# Patient Record
Sex: MALE | Race: BLACK OR AFRICAN AMERICAN | NOT HISPANIC OR LATINO | Employment: UNEMPLOYED | URBAN - METROPOLITAN AREA
[De-identification: names, ages, dates, MRNs, and addresses within clinical notes are randomized per-mention and may not be internally consistent; named-entity substitution may affect disease eponyms.]

---

## 2017-02-17 ENCOUNTER — HOSPITAL ENCOUNTER (EMERGENCY)
Facility: MEDICAL CENTER | Age: 27
End: 2017-02-17
Attending: EMERGENCY MEDICINE

## 2017-02-17 VITALS
OXYGEN SATURATION: 98 % | TEMPERATURE: 97.2 F | SYSTOLIC BLOOD PRESSURE: 115 MMHG | HEART RATE: 97 BPM | HEIGHT: 72 IN | RESPIRATION RATE: 14 BRPM | BODY MASS INDEX: 21.56 KG/M2 | WEIGHT: 159.17 LBS | DIASTOLIC BLOOD PRESSURE: 67 MMHG

## 2017-02-17 DIAGNOSIS — L02.91 ABSCESS: ICD-10-CM

## 2017-02-17 PROCEDURE — 700101 HCHG RX REV CODE 250

## 2017-02-17 PROCEDURE — 99283 EMERGENCY DEPT VISIT LOW MDM: CPT

## 2017-02-17 PROCEDURE — 303977 HCHG I & D

## 2017-02-17 RX ORDER — CEPHALEXIN 500 MG/1
500 CAPSULE ORAL 4 TIMES DAILY
Qty: 40 CAP | Refills: 0 | Status: SHIPPED | OUTPATIENT
Start: 2017-02-17 | End: 2017-02-24

## 2017-02-17 RX ORDER — SULFAMETHOXAZOLE AND TRIMETHOPRIM 800; 160 MG/1; MG/1
1 TABLET ORAL 2 TIMES DAILY
Qty: 14 TAB | Refills: 0 | Status: SHIPPED | OUTPATIENT
Start: 2017-02-17 | End: 2017-02-24

## 2017-02-17 RX ORDER — HYDROCODONE BITARTRATE AND ACETAMINOPHEN 5; 325 MG/1; MG/1
1-2 TABLET ORAL EVERY 4 HOURS PRN
Qty: 20 TAB | Refills: 0 | Status: SHIPPED | OUTPATIENT
Start: 2017-02-17

## 2017-02-17 RX ORDER — LIDOCAINE HYDROCHLORIDE 10 MG/ML
INJECTION, SOLUTION INFILTRATION; PERINEURAL
Status: COMPLETED
Start: 2017-02-17 | End: 2017-02-17

## 2017-02-17 RX ADMIN — LIDOCAINE HYDROCHLORIDE: 10 INJECTION, SOLUTION INFILTRATION; PERINEURAL at 09:26

## 2017-02-17 NOTE — ED NOTES
Pt discharged to home. Pt armband removed. Pt understands written and verbal d/c instructions.  Prescriptions given.  Pt to follow up with Surgery.  Pt verbalized understanding.

## 2017-02-17 NOTE — ED AVS SNAPSHOT
2/17/2017          Katerina Colbert  [address]  [place] [address]    Dear Katerina:    [facility] wants to ensure your discharge home is safe and you or your loved ones have had all your questions answered regarding your care after you leave the hospital.    You may receive a telephone call within two days of your discharge.  This call is to make certain you understand your discharge instructions as well as ensure we provided you with the best care possible during your stay with us.     The call will only last approximately 3-5 minutes and will be done by a nurse.    Once again, we want to ensure your discharge home is safe and that you have a clear understanding of any next steps in your care.  If you have any questions or concerns, please do not hesitate to contact us, we are here for you.  Thank you for choosing [facility] for your healthcare needs.    Sincerely,    Silvio Lerner    [facility]

## 2017-02-17 NOTE — ED NOTES
Chief Complaint   Patient presents with   • Abscess     Right leg    Pt to triage in NAD.  Pt educated on triage process and instructed to notify triage RN of any change in status.

## 2017-02-17 NOTE — ED AVS SNAPSHOT
UReserv Access Code: [email or web address]  Expires: 3/19/2017  9:48 AM    Your email address is not on file at [facility].  Email Addresses are required for you to sign up for UReserv, please contact [phone] to verify your personal information and to provide your email address prior to attempting to register for UReserv.    Katerina Gallegos Colbert  [address] [place] [address]    [employer]  A secure, online tool to manage your health information     [facility] [employer]’s UReserv® is a secure, online tool that connects you to your personalized health information from the privacy of your home -- day or night - making it very easy for you to manage your healthcare. Once the activation process is completed, you can even access your medical information using the UReserv gallito, which is available for free in the Apple Gallito store or Google Play store.     To learn more about UReserv, visit [email or web address]    There are two levels of access available (as shown below):   My Chart Features  [facility] Primary Care Doctor [facility]  Specialists [facility]  Urgent  Care Non-[facility] Primary Care Doctor   Email your healthcare team securely and privately 24/7 X X X    Manage appointments: schedule your next appointment; view details of past/upcoming appointments X      Request prescription refills. X      View recent personal medical records, including lab and immunizations X X X X   View health record, including health history, allergies, medications X X X X   Read reports about your outpatient visits, procedures, consult and ER notes X X X X   See your discharge summary, which is a recap of your hospital and/or ER visit that includes your diagnosis, lab results, and care plan X X  X     How to register for UReserv:  Once your e-mail address has been verified, follow the following steps to sign up for [employer].     1. Go to  [email or web address]  2. Click on the Sign Up Now box, which takes you to the New Member Sign Up page. You  will need to provide the following information:  a. Enter your Fjuul Access Code exactly as it appears at the top of this page. (You will not need to use this code after you’ve completed the sign-up process. If you do not sign up before the expiration date, you must request a new code.)   b. Enter your date of birth.   c. Enter your home email address.   d. Click Submit, and follow the next screen’s instructions.  3. Create a [employer] ID. This will be your Fjuul login ID and cannot be changed, so think of one that is secure and easy to remember.  4. Create a Fjuul password. You can change your password at any time.  5. Enter your Password Reset Question and Answer. This can be used at a later time if you forget your password.   6. Enter your e-mail address. This allows you to receive e-mail notifications when new information is available in Fjuul.  7. Click Sign Up. You can now view your health information.    For assistance activating your Fjuul account, call [phone]

## 2017-02-17 NOTE — ED PROVIDER NOTES
ED Provider Note    CHIEF COMPLAINT  Chief Complaint   Patient presents with   • Abscess     Right leg        HPI  Katerina Colbert is a 26 y.o. male who presents with and abscess to his right leg which was a small bump, but 3 days ago it started enlarging and filling with pus.   He has not had any fever or redness but it hurts to walk.   He denies any IVDA, no history of MRSA or wrestling    REVIEW OF SYSTEMS  Positive for abscess, Negative for fever or vomitingt  PAST MEDICAL HISTORY      none  SOCIAL HISTORY  Social History     Social History Main Topics   • Smoking status: Current Some Day Smoker -- 0.50 packs/day     Types: Cigarettes   • Smokeless tobacco: Not on file   • Alcohol Use: Yes      Comment: occasional   • Drug Use: No   • Sexual Activity: Not on file       SURGICAL HISTORY   has past surgical history that includes hernia repair.    CURRENT MEDICATIONS  Reviewed.  See Encounter Summary.  Include none    ALLERGIES  No Known Allergies    PHYSICAL EXAM  VITAL SIGNS: /67 mmHg  Pulse 97  Temp(Src) 36.2 °C (97.2 °F) (Temporal)  Resp 14  Ht 1.829 m (6')  Wt 72.2 kg (159 lb 2.8 oz)  BMI 21.58 kg/m2  SpO2 98%  Constitutional: Alert in no apparent distress.  HENT: Normocephalic, Bilateral external ears normal. Nose normal.   Eyes: Pupils are equal and reactive. Conjunctiva normal, non-icteric.   Thorax & Lungs: Easy unlabored respirations  Abdomen:  No gross signs of peritonitis, no pain with movement   Skin: quarter sized fluctuan mass medail to right thigh with central purulent region  Extremities:   No edema, No asymmetry  Neurologic: Alert, Grossly non-focal.   Psychiatric: Affect and Mood normal      COURSE & MEDICAL DECISION MAKING  Nursing notes and vital signs were reviewed. (See chart for details)    The patient presents to the Emergency Department with assistance than present on his legs for over a year which has become superinfected. In the emergency department decision was made  for IND    Incision and Drainage Procedure Note    Indication: Abscess    Procedure: The patient was positioned appropriately and the skin over the incision site was prepped with betadine and draped in a sterile fashion. Local anesthesia was obtained by infiltration using 1% Lidocaine without epinephrine.  An incision was then made over the apex of the lesion and approximately 1 cc of thick material was expressed. Loculations were not present. The drainage cavity was then packed with sterile gauze.     The patient tolerated the procedure well.    Complications: None    In the emergency room and the patient has a purulent aspect ring but there was still a large cystic feeling mass that discuss with him he should follow-up with general surgery it is Dr. Perez on call for evaluation and excision of the mass. He knows that if the redness increases or he has any worsening symptoms to return and I have discharged him home on Keflex and Bactrim as well as Vicodin for pain control  .     Patient was looked up in the narcotic website prior to prescription for narcotic pain medication.  Precautions on not driving and the addictive potential of pain medication was known to the patient.       The patient was discharged home with an information sheet on abscess and told to return immediately for any signs or symptoms listed, but specifically if fever increased size of lesion, or any worsening at all.  The patient verbally agreed to the discharge precautions and follow-up plan which is documented in EPIC.    FINAL IMPRESSION  1. Infected lesion right leg  2. Incision and drainage of right leg abscess  3.             Electronically signed by: Shaye Workman, 2/17/2017 9:29 AM

## 2017-02-17 NOTE — ED AVS SNAPSHOT
Home Care Instructions                                                                                                                Katerina Colbert   MRN: [id number]    Department:  [facility], Emergency Dept   Date of Visit:  2/17/2017            [facility], Emergency Dept    [address]    [facility] [address]    Phone:  [phone]      You were seen by     Shaye Workman M.D.      Your Diagnosis Was     Abscess     L02.91       These are the medications you received during your hospitalization from 02/17/2017 0905 to 02/17/2017 0948     Date/Time Order Dose Route Action    02/17/2017 0926 LIDOCAINE HCL 1 % INJ SOLN    Given      Follow-up Information     1. Follow up with Rajiv Perez M.D.. Schedule an appointment as soon as possible for a visit today.    Specialty:  Surgery    Why:  return if fever or increased redness, dressing changes twice a day    Contact information    [address] [facility] [address]  [address]  [facility] [id number] [phone]        Medication Information     Review all of your home medications and newly ordered medications with your primary doctor and/or pharmacist as soon as possible. Follow medication instructions as directed by your doctor and/or pharmacist.     Please keep your complete medication list with you and share with your physician. Update the information when medications are discontinued, doses are changed, or new medications (including over-the-counter products) are added; and carry medication information at all times in the event of emergency situations.               Medication List      START taking these medications        Instructions    cephALEXin 500 MG Caps   Commonly known as:  KEFLEX    Take 1 Cap by mouth 4 times a day for 7 days.   Dose:  500 mg       sulfamethoxazole-trimethoprim 800-160 MG tablet   Commonly known as:  BACTRIM DS    Take 1 Tab by mouth 2 times a day for 7 days.   Dose:  1 Tab         ASK your  doctor about these medications        Instructions    amoxicillin-clavulanate 875-125 MG Tabs   Commonly known as:  AUGMENTIN    Take 1 Tab by mouth 2 times a day.   Dose:  1 Tab       * hydrocodone-acetaminophen 5-325 MG Tabs per tablet   What changed:  Another medication with the same name was added. Make sure you understand how and when to take each.   Commonly known as:  NORCO   Ask about: Which instructions should I use?    Take 1-2 Tabs by mouth every four hours as needed.   Dose:  1-2 Tab       * hydrocodone-acetaminophen 5-325 MG Tabs per tablet   What changed:  You were already taking a medication with the same name, and this prescription was added. Make sure you understand how and when to take each.   Commonly known as:  NORCO   Ask about: Which instructions should I use?    Take 1-2 Tabs by mouth every four hours as needed.   Dose:  1-2 Tab       * Notice:  This list has 2 medication(s) that are the same as other medications prescribed for you. Read the directions carefully, and ask your doctor or other care provider to review them with you.              Discharge Instructions       Abscess  An abscess is an infected area that contains a collection of pus and debris. It can occur in almost any part of the body. An abscess is also known as a furuncle or boil.  CAUSES   An abscess occurs when tissue gets infected. This can occur from blockage of oil or sweat glands, infection of hair follicles, or a minor injury to the skin. As the body tries to fight the infection, pus collects in the area and creates pressure under the skin. This pressure causes pain. People with weakened immune systems have difficulty fighting infections and get certain abscesses more often.   SYMPTOMS  Usually an abscess develops on the skin and becomes a painful mass that is red, warm, and tender. If the abscess forms under the skin, you may feel a moveable soft area under the skin. Some abscesses break open (rupture) on their own, but  most will continue to get worse without care. The infection can spread deeper into the body and eventually into the bloodstream, causing you to feel ill.   DIAGNOSIS   Your caregiver will take your medical history and perform a physical exam. A sample of fluid may also be taken from the abscess to determine what is causing your infection.  TREATMENT   Your caregiver may prescribe antibiotic medicines to fight the infection. However, taking antibiotics alone usually does not cure an abscess. Your caregiver may need to make a small cut (incision) in the abscess to drain the pus. In some cases, gauze is packed into the abscess to reduce pain and to continue draining the area.  HOME CARE INSTRUCTIONS   · Only take over-the-counter or prescription medicines for pain, discomfort, or fever as directed by your caregiver.  · If you were prescribed antibiotics, take them as directed. Finish them even if you start to feel better.  · If gauze is used, follow your caregiver's directions for changing the gauze.  · To avoid spreading the infection:  ¨ Keep your draining abscess covered with a bandage.  ¨ Wash your hands well.  ¨ Do not share personal care items, towels, or whirlpools with others.  ¨ Avoid skin contact with others.  · Keep your skin and clothes clean around the abscess.  · Keep all follow-up appointments as directed by your caregiver.  SEEK MEDICAL CARE IF:   · You have increased pain, swelling, redness, fluid drainage, or bleeding.  · You have muscle aches, chills, or a general ill feeling.  · You have a fever.  MAKE SURE YOU:   · Understand these instructions.  · Will watch your condition.  · Will get help right away if you are not doing well or get worse.     This information is not intended to replace advice given to you by your health care provider. Make sure you discuss any questions you have with your health care provider.     Document Released: 09/27/2006 Document Revised: 06/18/2013 Document Reviewed:  03/01/2013  Elsevier Interactive Patient Education ©2016 Elsevier Inc.            Patient Information     Patient Information    Following emergency treatment: all patient requiring follow-up care must return either to a private physician or a clinic if your condition worsens before you are able to obtain further medical attention, please return to the emergency room.     Billing Information    At [facility], we work to make the billing process streamlined for our patients.  Our Representatives are here to answer any questions you may have regarding your hospital bill.  If you have insurance coverage and have supplied your insurance information to us, we will submit a claim to your insurer on your behalf.  Should you have any questions regarding your bill, we can be reached online or by phone as follows:  Online: You are able pay your bills online or live chat with our representatives about any billing questions you may have. We are here to help Monday - Friday from 8:00am to 7:30pm and 9:00am - 12:00pm on Saturdays.  Please visit [email or web address]  for more information.   Phone:  [phone] or [phone]    Please note that your emergency physician, surgeon, pathologist, radiologist, anesthesiologist, and other specialists are not employed by [facility] and will therefore bill separately for their services.  Please contact them directly for any questions concerning their bills at the numbers below:     Emergency Physician Services:  [phone]  [facility]:  [phone]  [facility]:  [phone]  [facility]:  [phone]    1. Your final bill may vary from the amount quoted upon discharge if all procedures are not complete at that time, or if your doctor has additional procedures of which we are not aware. You will receive an additional bill if you return to the Emergency Department at [facility] for suture removal  regardless of the facility of which the sutures were placed.     2. Please arrange for settlement of this account at the emergency registration.    3. All self-pay accounts are due in full at the time of treatment.  If you are unable to meet this obligation then payment is expected within 4-5 days.     4. If you have had radiology studies (CT, X-ray, Ultrasound, MRI), you have received a preliminary result during your emergency department visit. Please contact the radiology department [phone] to receive a copy of your final result. Please discuss the Final result with your primary physician or with the follow up physician provided.     Crisis Hotline:  [place] Crisis Hotline:  1-992-HRZRIZR or 1-871.477.8542  [place] Crisis Hotline:    1-483.551.4853 or 635-238-2668         ED Discharge Follow Up Questions    1. In order to provide you with very good care, we would like to follow up with a phone call in the next few days.  May we have your permission to contact you?     YES /  NO    2. What is the best phone number to call you? (       )_____-__________    3. What is the best time to call you?      Morning  /  Afternoon  /  Evening                   Patient Signature:  ____________________________________________________________    Date:  ____________________________________________________________

## 2017-02-17 NOTE — ED NOTES
Chief Complaint   Patient presents with   • Abscess     Right leg      Pt ambulatory to rm 61.  Pt c/o small abscess to right leg, 10/10 pain.  Pt noticed small bump X 3 days ago.  Abscess did start draining while pt was in lobby.  Chart up for ERP.

## 2023-05-10 ENCOUNTER — HOSPITAL ENCOUNTER (EMERGENCY)
Facility: MEDICAL CENTER | Age: 33
End: 2023-05-10
Attending: EMERGENCY MEDICINE
Payer: MEDICAID

## 2023-05-10 VITALS
TEMPERATURE: 97.9 F | BODY MASS INDEX: 22.65 KG/M2 | HEART RATE: 91 BPM | DIASTOLIC BLOOD PRESSURE: 91 MMHG | OXYGEN SATURATION: 99 % | HEIGHT: 71 IN | WEIGHT: 161.82 LBS | SYSTOLIC BLOOD PRESSURE: 136 MMHG | RESPIRATION RATE: 16 BRPM

## 2023-05-10 DIAGNOSIS — K08.89 PAIN, DENTAL: ICD-10-CM

## 2023-05-10 PROCEDURE — 99282 EMERGENCY DEPT VISIT SF MDM: CPT

## 2023-05-10 RX ORDER — IBUPROFEN 800 MG/1
800 TABLET ORAL EVERY 8 HOURS PRN
Qty: 30 TABLET | Refills: 0 | Status: SHIPPED | OUTPATIENT
Start: 2023-05-10

## 2023-05-10 RX ORDER — TRAMADOL HYDROCHLORIDE 50 MG/1
50 TABLET ORAL EVERY 6 HOURS PRN
Qty: 10 TABLET | Refills: 0 | Status: SHIPPED | OUTPATIENT
Start: 2023-05-10 | End: 2023-05-14

## 2023-05-10 RX ORDER — PENICILLIN V POTASSIUM 500 MG/1
500 TABLET ORAL 4 TIMES DAILY
Qty: 40 TABLET | Refills: 0 | Status: ACTIVE | OUTPATIENT
Start: 2023-05-10 | End: 2023-05-20

## 2023-05-10 NOTE — ED TRIAGE NOTES
"Katerina Colbert  32 y.o. male  Chief Complaint   Patient presents with    Tooth Abscess     Left side x 2 days, swelling present to the left side of the face. Patient reports \"pressure.\" Patient denies pain to the tooth.        Vitals:    05/10/23 1355   BP: 123/82   Pulse: 94   Resp: 17   Temp: 36.3 °C (97.4 °F)   SpO2: 98%       Triage process explained to patient, apologized for wait time, and returned to lobby.  Pt informed to notify staff of any change in condition.     "

## 2023-05-10 NOTE — ED NOTES
Patient ambulated to [facility] without incident. Primary assessment complete. Patient oriented to care area. Chart up for ERP.

## 2023-05-10 NOTE — ED PROVIDER NOTES
"ED Provider Note    CHIEF COMPLAINT  Chief Complaint   Patient presents with    Tooth Abscess     Left side x 2 days, swelling present to the left side of the face. Patient reports \"pressure.\" Patient denies pain to the tooth.        EXTERNAL RECORDS REVIEWED  None    HPI/ROS  LIMITATION TO HISTORY   None  OUTSIDE HISTORIAN(S):  None    Delfinviralpennie Colbert is a 32 y.o. male who presents here for evaluation of left-sided facial swelling.  Patient states that he has a bad tooth in the upper left part of his mouth, and for 2 days he has noticed some increasing pain and some left cheek swelling.  He has no fever or chills, and no vomiting.  Patient states he has no neck pain, back pain, or chest pain or shortness of breath.  He has no trouble eating or drinking.    PAST MEDICAL HISTORY   No medical concerns    SURGICAL HISTORY   has a past surgical history that includes hernia repair.    FAMILY HISTORY  History reviewed. No pertinent family history.    SOCIAL HISTORY  Social History     Tobacco Use    Smoking status: Some Days     Packs/day: 0.50     Types: Cigarettes    Smokeless tobacco: Not on file   Vaping Use    Vaping Use: Not on file   Substance and Sexual Activity    Alcohol use: Yes     Comment: occasional    Drug use: No    Sexual activity: Not on file       CURRENT MEDICATIONS  Home Medications    **Home medications have not yet been reviewed for this encounter**         ALLERGIES  No Known Allergies    PHYSICAL EXAM  VITAL SIGNS: /82   Pulse 94   Temp 36.3 °C (97.4 °F) (Temporal)   Resp 17   Ht 1.803 m (5' 11\")   Wt 73.4 kg (161 lb 13.1 oz)   SpO2 98%   BMI 22.57 kg/m²    Constitutional: Well developed, well nourished. No acute distress.  HEENT: Normocephalic, atraumatic. Posterior pharynx clear and moist.  Left-sided facial swelling, no trismus, poor dentition.  Eyes:  EOMI. Normal sclera.  Neck: Supple, Full range of motion, nontender.  Musculoskeletal: No deformity, no edema, " neurovascular intact.   Neuro: Clear speech, appropriate, cooperative, cranial nerves II-XII grossly intact.  Psych: Normal mood and affect      DIAGNOSTIC STUDIES / PROCEDURES  None    RADIOLOGY  Patient declined    COURSE & MEDICAL DECISION MAKING        INITIAL ASSESSMENT, COURSE AND PLAN  Care Narrative: This is a 32-year-old male here for evaluation of left-sided facial swelling.  It is noted that the patient has a dental infection and possible abscess, however he is declined a CT scan at this time because he needs to go in  his kids.  The patient states he would take antibiotics, something for pain, and will return for any increasing pain or swelling.            DISPOSITION AND DISCUSSIONS  I have discussed management of the patient with the following physicians and [place]'s: None    Discussion of management with other Q or appropriate source(s): None    Escalation of care considered, and ultimately not performed: None    Barriers to care at this time, including but not limited to: Patient does not have established PCP.     Decision tools and prescription drugs considered including, but not limited to: Penicillin.    FINAL DIAGNOSIS  1. Pain, dental           Electronically signed by: Kiran Moctezuma D.O., 5/10/2023 3:26 PM

## 2023-05-10 NOTE — ED NOTES
Pt aox4, vss, nad, ambulatory steady  Pt understood all dc info and when to seek medical care, no further questions

## 2023-05-11 ENCOUNTER — HOSPITAL ENCOUNTER (EMERGENCY)
Facility: MEDICAL CENTER | Age: 33
End: 2023-05-11
Attending: EMERGENCY MEDICINE
Payer: MEDICAID

## 2023-05-11 ENCOUNTER — APPOINTMENT (OUTPATIENT)
Dept: RADIOLOGY | Facility: MEDICAL CENTER | Age: 33
End: 2023-05-11
Attending: EMERGENCY MEDICINE
Payer: MEDICAID

## 2023-05-11 VITALS
BODY MASS INDEX: 22.23 KG/M2 | TEMPERATURE: 97.2 F | DIASTOLIC BLOOD PRESSURE: 85 MMHG | HEART RATE: 74 BPM | WEIGHT: 159.39 LBS | RESPIRATION RATE: 16 BRPM | OXYGEN SATURATION: 97 % | SYSTOLIC BLOOD PRESSURE: 135 MMHG

## 2023-05-11 DIAGNOSIS — K04.7 DENTAL ABSCESS: ICD-10-CM

## 2023-05-11 LAB
ANION GAP SERPL CALC-SCNC: 12 MMOL/L (ref 7–16)
BASOPHILS # BLD AUTO: 0.5 % (ref 0–1.8)
BASOPHILS # BLD: 0.04 K/UL (ref 0–0.12)
BUN SERPL-MCNC: 12 MG/DL (ref 8–22)
CALCIUM SERPL-MCNC: 9.6 MG/DL (ref 8.5–10.5)
CHLORIDE SERPL-SCNC: 99 MMOL/L (ref 96–112)
CO2 SERPL-SCNC: 26 MMOL/L (ref 20–33)
CREAT SERPL-MCNC: 0.99 MG/DL (ref 0.5–1.4)
EOSINOPHIL # BLD AUTO: 0.09 K/UL (ref 0–0.51)
EOSINOPHIL NFR BLD: 1.1 % (ref 0–6.9)
ERYTHROCYTE [DISTWIDTH] IN BLOOD BY AUTOMATED COUNT: 44.6 FL (ref 35.9–50)
GFR SERPLBLD CREATININE-BSD FMLA CKD-EPI: 102 ML/MIN/1.73 M 2
GLUCOSE SERPL-MCNC: 93 MG/DL (ref 65–99)
HCT VFR BLD AUTO: 45.7 % (ref 42–52)
HGB BLD-MCNC: 15 G/DL (ref 14–18)
IMM GRANULOCYTES # BLD AUTO: 0.04 K/UL (ref 0–0.11)
IMM GRANULOCYTES NFR BLD AUTO: 0.5 % (ref 0–0.9)
LYMPHOCYTES # BLD AUTO: 2.12 K/UL (ref 1–4.8)
LYMPHOCYTES NFR BLD: 26.1 % (ref 22–41)
MCH RBC QN AUTO: 29.7 PG (ref 27–33)
MCHC RBC AUTO-ENTMCNC: 32.8 G/DL (ref 33.7–35.3)
MCV RBC AUTO: 90.5 FL (ref 81.4–97.8)
MONOCYTES # BLD AUTO: 1.14 K/UL (ref 0–0.85)
MONOCYTES NFR BLD AUTO: 14 % (ref 0–13.4)
NEUTROPHILS # BLD AUTO: 4.7 K/UL (ref 1.82–7.42)
NEUTROPHILS NFR BLD: 57.8 % (ref 44–72)
NRBC # BLD AUTO: 0 K/UL
NRBC BLD-RTO: 0 /100 WBC
PLATELET # BLD AUTO: 192 K/UL (ref 164–446)
PMV BLD AUTO: 8.8 FL (ref 9–12.9)
POTASSIUM SERPL-SCNC: 4 MMOL/L (ref 3.6–5.5)
RBC # BLD AUTO: 5.05 M/UL (ref 4.7–6.1)
SODIUM SERPL-SCNC: 137 MMOL/L (ref 135–145)
WBC # BLD AUTO: 8.1 K/UL (ref 4.8–10.8)

## 2023-05-11 PROCEDURE — 99284 EMERGENCY DEPT VISIT MOD MDM: CPT

## 2023-05-11 PROCEDURE — 700111 HCHG RX REV CODE 636 W/ 250 OVERRIDE (IP): Mod: UD | Performed by: EMERGENCY MEDICINE

## 2023-05-11 PROCEDURE — 85025 COMPLETE CBC W/AUTO DIFF WBC: CPT

## 2023-05-11 PROCEDURE — 96375 TX/PRO/DX INJ NEW DRUG ADDON: CPT

## 2023-05-11 PROCEDURE — 700105 HCHG RX REV CODE 258: Mod: UD | Performed by: EMERGENCY MEDICINE

## 2023-05-11 PROCEDURE — 70355 PANORAMIC X-RAY OF JAWS: CPT

## 2023-05-11 PROCEDURE — 80048 BASIC METABOLIC PNL TOTAL CA: CPT

## 2023-05-11 PROCEDURE — 304217 HCHG IRRIGATION SYSTEM

## 2023-05-11 PROCEDURE — 303977 HCHG I & D

## 2023-05-11 PROCEDURE — 96365 THER/PROPH/DIAG IV INF INIT: CPT

## 2023-05-11 PROCEDURE — 36415 COLL VENOUS BLD VENIPUNCTURE: CPT

## 2023-05-11 RX ORDER — KETOROLAC TROMETHAMINE 30 MG/ML
30 INJECTION, SOLUTION INTRAMUSCULAR; INTRAVENOUS ONCE
Status: COMPLETED | OUTPATIENT
Start: 2023-05-11 | End: 2023-05-11

## 2023-05-11 RX ADMIN — LIDOCAINE HYDROCHLORIDE 20 ML: 10 INJECTION, SOLUTION EPIDURAL; INFILTRATION; INTRACAUDAL at 11:20

## 2023-05-11 RX ADMIN — KETOROLAC TROMETHAMINE 30 MG: 30 INJECTION, SOLUTION INTRAMUSCULAR; INTRAVENOUS at 10:45

## 2023-05-11 RX ADMIN — AMPICILLIN AND SULBACTAM 3 G: 1; 2 INJECTION, POWDER, FOR SOLUTION INTRAMUSCULAR; INTRAVENOUS at 10:45

## 2023-05-11 NOTE — ED TRIAGE NOTES
Ambulates to triage  Chief Complaint   Patient presents with    Oral Swelling     L upper dental abscess, was seen here yesterday for same, was told to come back if the pain/swelling got worse.      Pt took abx in the hospital yesterday, but was not able to  his rx at [facility] when he was discharged.

## 2023-05-11 NOTE — ED NOTES
Discharge instructions discussed with pt, verbalizes understanding. PIV removed. All belongings with pt when leaving unit. Pt to lobby with steady gait.

## 2023-05-11 NOTE — ED PROVIDER NOTES
ED Provider Note    CHIEF COMPLAINT  Chief Complaint   Patient presents with    Oral Swelling     L upper dental abscess, was seen here yesterday for same, was told to come back if the pain/swelling got worse.        EXTERNAL RECORDS REVIEWED  Outpatient Notes patient was seen at [facility] 8/13/2014 for an open wound to his foot    HPI/ROS  LIMITATION TO HISTORY   Select: : None  OUTSIDE HISTORIAN(S):   none    Katreina Colbert is a 32 y.o. male who presents with worsening swelling to his left face and cheek due to a dental abscess.  The patient was seen here yesterday and placed on antibiotics and anti-inflammatories.  He is back today because the swelling and pain are worse.  He denies any trouble swallowing trouble with his speech.  He has no submandibular swelling.  He denies any drooling or inability to open his mouth or neck stiffness.  He has no fevers or chills.  He states he was not referred to an oral surgeon and does not have a dentist.  He denies any history of diabetes.    PAST MEDICAL HISTORY       SURGICAL HISTORY   has a past surgical history that includes hernia repair.    FAMILY HISTORY  History reviewed. No pertinent family history.    SOCIAL HISTORY  Social History     Tobacco Use    Smoking status: Some Days     Packs/day: 0.50     Types: Cigarettes    Smokeless tobacco: Not on file   Vaping Use    Vaping Use: Not on file   Substance and Sexual Activity    Alcohol use: Yes     Comment: occasional    Drug use: No    Sexual activity: Not on file       CURRENT MEDICATIONS  Home Medications       Reviewed by Leida Golden R.N. (Registered Nurse) on 05/11/23 at 0915  Med List Status: Partial     Medication Last Dose Status   amoxicillin-clavulanate (AUGMENTIN) 875-125 MG Tab 5/10/2023 Active   hydrocodone-acetaminophen (NORCO) 5-325 MG Tab per tablet  Active   hydrocodone-acetaminophen (NORCO) 5-325 MG Tab per tablet  Active   ibuprofen (MOTRIN) 800 MG Tab   Active   penicillin v potassium (VEETID) 500 MG Tab  Active   traMADol (ULTRAM) 50 MG Tab  Active                    ALLERGIES  No Known Allergies    PHYSICAL EXAM  VITAL SIGNS: BP (!) 156/92   Pulse 99   Temp 36.3 °C (97.4 °F) (Temporal)   Resp 16   Wt 72.3 kg (159 lb 6.3 oz)   SpO2 93%   BMI 22.23 kg/m²      Constitutional: Well developed, Well nourished, No acute respiratory distress, Non-toxic appearance.   HENT: Normocephalic, Atraumatic, Bilateral external ears normal, Oropharynx clear, mucous membranes are moist.  Positive for dental caries of the left upper incisor with a gingival abscess above the tooth.  Positive facial swelling to the left cheek and under his left eye without warmth or erythema he has no submandibular swelling.  Eyes: Conjunctiva normal, No discharge. No icterus.  Neck: Normal range of motion. Supple.  Lymphatic: Mild cervical lymphadenopathy noted.   Cardiovascular: Normal heart rate, Normal rhythm, No murmurs, No rubs, No gallops.   Thorax & Lungs: Clear to auscultation bilaterally, No respiratory distress, No wheezing.  Skin: Warm, Dry, No erythema, No rash.   Extremities: Intact distal pulses, No edema, No tenderness  Musculoskeletal: Good range of motion in all major joints. Normal gait.  Neurologic: Alert & oriented x 3. No focal deficits noted.   Psych: Alert normal affect       DIAGNOSTIC STUDIES / PROCEDURES  EKG  I have independently interpreted this EKG  None    LABS  Results for orders placed or performed during the hospital encounter of 05/11/23   CBC WITH DIFFERENTIAL   Result Value Ref Range    WBC 8.1 4.8 - 10.8 K/uL    RBC 5.05 4.70 - 6.10 M/uL    Hemoglobin 15.0 14.0 - 18.0 g/dL    Hematocrit 45.7 42.0 - 52.0 %    MCV 90.5 81.4 - 97.8 fL    MCH 29.7 27.0 - 33.0 pg    MCHC 32.8 (L) 33.7 - 35.3 g/dL    RDW 44.6 35.9 - 50.0 fL    Platelet Count 192 164 - 446 K/uL    MPV 8.8 (L) 9.0 - 12.9 fL    Neutrophils-Polys 57.80 44.00 - 72.00 %    Lymphocytes 26.10 22.00 - 41.00  %    Monocytes 14.00 (H) 0.00 - 13.40 %    Eosinophils 1.10 0.00 - 6.90 %    Basophils 0.50 0.00 - 1.80 %    Immature Granulocytes 0.50 0.00 - 0.90 %    Nucleated RBC 0.00 /100 WBC    Neutrophils (Absolute) 4.70 1.82 - 7.42 K/uL    Lymphs (Absolute) 2.12 1.00 - 4.80 K/uL    Monos (Absolute) 1.14 (H) 0.00 - 0.85 K/uL    Eos (Absolute) 0.09 0.00 - 0.51 K/uL    Baso (Absolute) 0.04 0.00 - 0.12 K/uL    Immature Granulocytes (abs) 0.04 0.00 - 0.11 K/uL    NRBC (Absolute) 0.00 K/uL   Basic Metabolic Panel   Result Value Ref Range    Sodium 137 135 - 145 mmol/L    Potassium 4.0 3.6 - 5.5 mmol/L    Chloride 99 96 - 112 mmol/L    Co2 26 20 - 33 mmol/L    Glucose 93 65 - 99 mg/dL    Bun 12 8 - 22 mg/dL    Creatinine 0.99 0.50 - 1.40 mg/dL    Calcium 9.6 8.5 - 10.5 mg/dL    Anion Gap 12.0 7.0 - 16.0   ESTIMATED GFR   Result Value Ref Range    GFR (CKD-EPI) 102 >60 mL/min/1.73 m 2        RADIOLOGY  I have independently interpreted the diagnostic imaging associated with this visit and am waiting the final reading from the radiologist.   My preliminary interpretation is as follows: Mandible x-ray positive for cavities  Radiologist interpretation:   [id number]   Final Result      Significant dental disease with multiple cavities and endodontal disease.            COURSE & MEDICAL DECISION MAKING    ED Observation Status? Yes; I am placing the patient in to an observation status due to a diagnostic uncertainty as well as therapeutic intensity. Patient placed in observation status at 10:23 AM, 5/11/2023.     Observation plan is as follows: Panorex, IV Unasyn lab work Toradol IV as well as I&D of the abscess in his mouth     Upon Reevaluation, the patient's condition has: Improved; and will be discharged.    Patient discharged from ED Observation status at 11:40 AM  (Time) 5/11/23 (Date).     INITIAL ASSESSMENT, COURSE AND PLAN  Care Narrative: Katerina Colbert is a 32 y.o. male who presents with worsening swelling  to his left face and cheek due to a dental abscess.  The patient was seen here yesterday and placed on antibiotics and anti-inflammatories.  He is back today because the swelling and pain are worse.  He denies any trouble swallowing trouble with his speech.  He has no submandibular swelling.  He denies any drooling or inability to open his mouth or neck stiffness.  He has no fevers or chills.  He states he was not referred to an oral surgeon and does not have a dentist.  He denies any history of diabetes.  On physical exam he has positive dental caries of the left upper incisor with a gingival abscess above the tooth.  Positive facial swelling to the left cheek and under his left eye without warmth or erythema he has no submandibular swelling.        ADDITIONAL PROBLEM LIST  Tobacco use  DISPOSITION AND DISCUSSIONS  Incision and Drainage Procedure Note    Indication: Abscess gingival    Procedure: The patient was positioned appropriately and the skin over the incision site was prepped with alcohol. Local anesthesia was obtained by infiltration using 1% Lidocaine without epinephrine.  An incision was then made over the apex of the lesion and approximately 3 cc of foul smelling and purulent material was expressed. Loculations were not present. The drainage cavity was then irrigated.     The patient tolerated the procedure well.    Complications: None      The patient's CBC and BMP are unremarkable.  He is not diabetic.  He tolerated the incision and drainage of his gingival abscess well.  I advised him to continue the antibiotics he was prescribed and continue anti-inflammatories and salt water gargles.  He should follow-up with Dr. Serna Oral surgery for definitive care of the tooth.  He should return for any worsening swelling high fevers neck stiffness or worsening symptoms.    I have discussed management of the patient with the following physicians and DAYTON's:  none    Discussion of management with other QHP or  appropriate source(s): None     Escalation of care considered, and ultimately not performed:acute inpatient care management, however at this time, the patient is most appropriate for outpatient management    Barriers to care at this time, including but not limited to: Patient does not have established PCP.     Decision tools and prescription drugs considered including, but not limited to: Pain Medications toradol and antibiotics unasyn .  The patient will return for new or worsening symptoms and is stable at the time of discharge.    The patient is referred to a primary physician for blood pressure management, diabetic screening, and for all other preventative health concerns.      DISPOSITION:  Patient will be discharged home in stable condition.    FOLLOW UP:  Yariel Serna M.D.  [address]  [facility] [id number] [phone]    Call in 1 day  to establish care, for recheck      OUTPATIENT MEDICATIONS:  New Prescriptions    No medications on file   Continue your antibiotic prescribed yesterday    FINAL DIAGNOSIS  1. Dental abscess           Electronically signed by: Constanza Bradford M.D., 5/11/2023 10:20 AM

## 2023-05-11 NOTE — ED NOTES
Received report from paulo ALCANTARA. Pt to yellow [facility]. PIV placed and blood sent. I&D supplies at bedside.

## 2023-07-21 ENCOUNTER — HOSPITAL ENCOUNTER (EMERGENCY)
Facility: MEDICAL CENTER | Age: 33
End: 2023-07-21
Attending: STUDENT IN AN ORGANIZED HEALTH CARE EDUCATION/TRAINING PROGRAM
Payer: MEDICAID

## 2023-07-21 VITALS
HEART RATE: 69 BPM | BODY MASS INDEX: 22.47 KG/M2 | RESPIRATION RATE: 16 BRPM | WEIGHT: 160.5 LBS | OXYGEN SATURATION: 98 % | DIASTOLIC BLOOD PRESSURE: 99 MMHG | SYSTOLIC BLOOD PRESSURE: 150 MMHG | TEMPERATURE: 97.2 F | HEIGHT: 71 IN

## 2023-07-21 DIAGNOSIS — K04.7 DENTAL ABSCESS: ICD-10-CM

## 2023-07-21 PROCEDURE — 99283 EMERGENCY DEPT VISIT LOW MDM: CPT

## 2023-07-21 PROCEDURE — A9270 NON-COVERED ITEM OR SERVICE: HCPCS | Mod: UD | Performed by: STUDENT IN AN ORGANIZED HEALTH CARE EDUCATION/TRAINING PROGRAM

## 2023-07-21 PROCEDURE — 303977 HCHG I & D

## 2023-07-21 PROCEDURE — 700102 HCHG RX REV CODE 250 W/ 637 OVERRIDE(OP): Mod: UD | Performed by: STUDENT IN AN ORGANIZED HEALTH CARE EDUCATION/TRAINING PROGRAM

## 2023-07-21 PROCEDURE — 700111 HCHG RX REV CODE 636 W/ 250 OVERRIDE (IP): Mod: UD | Performed by: STUDENT IN AN ORGANIZED HEALTH CARE EDUCATION/TRAINING PROGRAM

## 2023-07-21 RX ORDER — IBUPROFEN 600 MG/1
600 TABLET ORAL EVERY 6 HOURS PRN
Qty: 30 TABLET | Refills: 0 | Status: SHIPPED | OUTPATIENT
Start: 2023-07-21

## 2023-07-21 RX ORDER — AMOXICILLIN AND CLAVULANATE POTASSIUM 875; 125 MG/1; MG/1
1 TABLET, FILM COATED ORAL 2 TIMES DAILY
Qty: 20 TABLET | Refills: 0 | Status: ACTIVE | OUTPATIENT
Start: 2023-07-21 | End: 2023-07-31

## 2023-07-21 RX ORDER — ACETAMINOPHEN 325 MG/1
650 TABLET ORAL ONCE
Status: COMPLETED | OUTPATIENT
Start: 2023-07-21 | End: 2023-07-21

## 2023-07-21 RX ORDER — IBUPROFEN 600 MG/1
600 TABLET ORAL ONCE
Status: COMPLETED | OUTPATIENT
Start: 2023-07-21 | End: 2023-07-21

## 2023-07-21 RX ORDER — PENICILLIN V POTASSIUM 500 MG/1
500 TABLET ORAL ONCE
Status: COMPLETED | OUTPATIENT
Start: 2023-07-21 | End: 2023-07-21

## 2023-07-21 RX ORDER — ACETAMINOPHEN 500 MG
500-1000 TABLET ORAL EVERY 6 HOURS PRN
Qty: 30 TABLET | Refills: 0 | Status: SHIPPED | OUTPATIENT
Start: 2023-07-21

## 2023-07-21 RX ADMIN — LIDOCAINE HYDROCHLORIDE 20 ML: 10 INJECTION, SOLUTION EPIDURAL; INFILTRATION; INTRACAUDAL at 18:30

## 2023-07-21 RX ADMIN — PENICILLIN V POTASSIUM 500 MG: 500 TABLET, FILM COATED ORAL at 18:30

## 2023-07-21 RX ADMIN — IBUPROFEN 600 MG: 600 TABLET, FILM COATED ORAL at 18:29

## 2023-07-21 RX ADMIN — ACETAMINOPHEN 650 MG: 325 TABLET, FILM COATED ORAL at 18:28

## 2023-07-21 ASSESSMENT — PAIN DESCRIPTION - PAIN TYPE: TYPE: ACUTE PAIN

## 2023-07-22 NOTE — ED TRIAGE NOTES
"Chief Complaint   Patient presents with    Dental Pain     With swelling, Right upper tooth     Pt seen for same in may. Reports that he did not  abx.   /80   Pulse 77   Temp 36.2 °C (97.2 °F) (Temporal)   Resp 14   Ht 1.803 m (5' 11\")   Wt 72.8 kg (160 lb 7.9 oz)   SpO2 100% .  Pt informed of wait times. Educated on triage process.  Asked to return to triage RN for any new or worsening of symptoms. Thanked for patience.      "

## 2023-07-22 NOTE — DISCHARGE INSTRUCTIONS
You were seen in the emergency department for dental pain.  You are found to have an abscess that was incised and drained.  Please  antibiotics tonight and start taking them.  You need to be seen by dentist for definitive management of this.  If you develop any worsening facial swelling, fever, vomiting or any other concerns, please return to the ER immediately   
no

## 2023-07-22 NOTE — ED NOTES
Pt discharged to home. Pt provided education and discharge instructions per ERP. Pt ambulatory out of ED with steady gait.

## 2023-07-22 NOTE — ED PROVIDER NOTES
ED Provider Note    CHIEF COMPLAINT  Chief Complaint   Patient presents with    Dental Pain     With swelling, Right upper tooth       EXTERNAL RECORDS REVIEWED  Other Patient seen in the ER 05/11/2023 for left dental abscess    HPI/ROS  LIMITATION TO HISTORY   Select: : None  OUTSIDE HISTORIAN(S):  None    Katerina Colbert is a 32 y.o. male who presents with left upper dental pain with associated slight facial swelling that started last night.  He notes he has a bad tooth in that area and has had abscess in this area previously.  He was seen in the emergency room for this in May of this year and had incision and drainage but never picked up his antibiotics.  He took ibuprofen this morning.  He has no fever, chills, difficulty swallowing, swelling to the lower face, neck pain, headache.  He does not have a dentist and was previously referred to an oral surgeon but did not go.  He has no history of diabetes or any other medical problems that would make him immunocompromised.    PAST MEDICAL HISTORY   He denies chronic medical problems    SURGICAL HISTORY   has a past surgical history that includes hernia repair.    FAMILY HISTORY  No family history on file.    SOCIAL HISTORY  Social History     Tobacco Use    Smoking status: Some Days     Packs/day: 0.50     Types: Cigarettes    Smokeless tobacco: Not on file   Vaping Use    Vaping Use: Not on file   Substance and Sexual Activity    Alcohol use: Yes     Comment: occasional    Drug use: No    Sexual activity: Not on file       CURRENT MEDICATIONS  Home Medications       Reviewed by Maryam Bedoya R.N. (Registered Nurse) on 07/21/23 at 1742  Med List Status: Partial     Medication Last Dose Status   amoxicillin-clavulanate (AUGMENTIN) 875-125 MG Tab  Active   hydrocodone-acetaminophen (NORCO) 5-325 MG Tab per tablet  Active   hydrocodone-acetaminophen (NORCO) 5-325 MG Tab per tablet  Active   ibuprofen (MOTRIN) 800 MG Tab  Active               "      ALLERGIES  No Known Allergies    PHYSICAL EXAM  VITAL SIGNS: /80   Pulse 77   Temp 36.2 °C (97.2 °F) (Temporal)   Resp 14   Ht 1.803 m (5' 11\")   Wt 72.8 kg (160 lb 7.9 oz)   SpO2 100%   BMI 22.38 kg/m²      Constitutional: Well developed, Well nourished, No acute respiratory distress, Non-toxic appearance.  Speaking full sentences  HENT: Normocephalic, Atraumatic, Bilateral external ears normal, Oropharynx clear, mucous membranes are moist.  Multiple dental caries noted.  Left upper incisor has significant dental caries and decay and there is gingival abscess just above this tooth.  He has slight swelling to the left cheek.  Submandibular compartment is soft there is no lower facial swelling.  There is no associated warmth or erythema.  He has no trismus and is tolerating his own secretions  Eyes: Conjunctiva normal, No discharge. No icterus.  Neck: Normal range of motion. Supple.  Lymphatic: No cervical lymphadenopathy noted.   Cardiovascular: Normal heart rate, Normal rhythm  Thorax & Lungs: Clear to auscultation bilaterally, No respiratory distress, No wheezing.  Skin: Warm, Dry, No erythema, No rash.   Extremities: Intact distal pulses, No edema, No tenderness  Neurologic: Alert & oriented x 3.  Cranial nerves II through XII intact  Psych: Alert normal affect       DIAGNOSTIC STUDIES / PROCEDURES  Incision and Drainage Procedure Note     Indication: Abscess gingival     Procedure: The patient was positioned appropriately and the skin over the incision site was prepped with alcohol. Local anesthesia was obtained by infiltration using 1 cc of 1% Lidocaine without epinephrine.  An incision was then made over the apex of the lesion and approximately 5 cc of foul smelling and purulent material was expressed. Loculations were not present. The drainage cavity was then irrigated with normal saline.      The patient tolerated the procedure well.     Complications: None    COURSE & MEDICAL DECISION " MAKING    ED Observation Status? No; Patient does not meet criteria for ED Observation.     INITIAL ASSESSMENT, COURSE AND PLAN  Care Narrative: This is a 32-year-old male who presents with left upper dental pain and associated facial swelling to the upper cheek that started yesterday evening.  He has been seen previously for same symptoms in May of this year and required incision and drainage of upper gingival abscess.  He was prescribed antibiotics at that time but did not pick them up.     Patient with significant dental caries with a decayed left upper incisor and has apical gingival abscess.  There is no trismus.  He has no submandibular swelling to suggest Blaine's angina.  He is tolerating his own secretions.  He has no fevers, chills or other systemic signs of illness.  I did consider an even offered obtaining laboratory studies as well as CT scan to evaluate for deeper infection but patient declined and states that he just wants incision and drainage of the abscess and oral antibiotics.  I think this is reasonable given there is no systemic signs of illness and he is not immunocompromised.  He has no underlying medical problems.    Incision and drainage done as detailed above.  Patient tolerated procedure well.  He was given Tylenol and ibuprofen.  He was given dose of antibiotics here.  Prescription for Augmentin was sent to his pharmacy.  He states that now that he has Medicaid he will be able to  the prescription.  He will start taking it.  I did refer him to oral surgeon on-call that he can contact for definitive management.  He is also encouraged to follow-up with dentist.  He understands that he needs to see a dental specialist to get these symptoms under control.  I reviewed strict ER return precautions with the patient including worsening swelling, fevers, vomiting, headache, visual changes or any other concerns.  Patient is agreeable to discharge plan with no further questions           DISPOSITION AND DISCUSSIONS  The patient is discharged home in stable condition with referral to oral surgeon, Dr. Hooper.  Strict ER return precautions were reviewed.  Patient agreeable to discharge plan with no further questions    I have discussed management of the patient with the following physicians and DAYTON's:  None    Discussion of management with other QHP or appropriate source(s): None     Escalation of care considered, and ultimately not performed:blood analysis and diagnostic imaging    Barriers to care at this time, including but not limited to:  Patient does not have a dentist .   None    The patient will return for new or worsening symptoms and is stable at the time of discharge.     The patient is referred to a primary physician for blood pressure management, diabetic screening, and for all other preventative health concerns.    FINAL DIAGNOSIS  1. Dental abscess         Electronically signed by: Claudia Lopez M.D., 7/21/2023 6:01 PM